# Patient Record
Sex: FEMALE | Race: WHITE | Employment: FULL TIME | URBAN - METROPOLITAN AREA
[De-identification: names, ages, dates, MRNs, and addresses within clinical notes are randomized per-mention and may not be internally consistent; named-entity substitution may affect disease eponyms.]

---

## 2024-08-18 SDOH — HEALTH STABILITY: PHYSICAL HEALTH: ON AVERAGE, HOW MANY DAYS PER WEEK DO YOU ENGAGE IN MODERATE TO STRENUOUS EXERCISE (LIKE A BRISK WALK)?: 7 DAYS

## 2024-08-18 SDOH — HEALTH STABILITY: PHYSICAL HEALTH: ON AVERAGE, HOW MANY MINUTES DO YOU ENGAGE IN EXERCISE AT THIS LEVEL?: 60 MIN

## 2024-08-19 ENCOUNTER — OFFICE VISIT (OUTPATIENT)
Dept: FAMILY MEDICINE CLINIC | Age: 29
End: 2024-08-19

## 2024-08-19 ENCOUNTER — TELEPHONE (OUTPATIENT)
Dept: FAMILY MEDICINE CLINIC | Age: 29
End: 2024-08-19

## 2024-08-19 VITALS
HEIGHT: 64 IN | DIASTOLIC BLOOD PRESSURE: 68 MMHG | BODY MASS INDEX: 24.69 KG/M2 | SYSTOLIC BLOOD PRESSURE: 100 MMHG | OXYGEN SATURATION: 98 % | WEIGHT: 144.6 LBS | HEART RATE: 77 BPM | TEMPERATURE: 98 F

## 2024-08-19 DIAGNOSIS — H91.90 HEARING LOSS, UNSPECIFIED HEARING LOSS TYPE, UNSPECIFIED LATERALITY: Primary | ICD-10-CM

## 2024-08-19 DIAGNOSIS — R42 DIZZINESS: ICD-10-CM

## 2024-08-19 DIAGNOSIS — H53.132 ACUTE LOSS OF VISION, LEFT: ICD-10-CM

## 2024-08-19 DIAGNOSIS — Z00.00 HEALTHCARE MAINTENANCE: ICD-10-CM

## 2024-08-19 DIAGNOSIS — R42 VERTIGO: Primary | ICD-10-CM

## 2024-08-19 RX ORDER — ONDANSETRON 4 MG/1
4 TABLET, ORALLY DISINTEGRATING ORAL DAILY PRN
Qty: 90 TABLET | Refills: 0 | Status: SHIPPED | OUTPATIENT
Start: 2024-08-19

## 2024-08-19 RX ORDER — LEVONORGESTREL 52 MG/1
1 INTRAUTERINE DEVICE INTRAUTERINE ONCE
COMMUNITY

## 2024-08-19 SDOH — ECONOMIC STABILITY: FOOD INSECURITY: WITHIN THE PAST 12 MONTHS, THE FOOD YOU BOUGHT JUST DIDN'T LAST AND YOU DIDN'T HAVE MONEY TO GET MORE.: NEVER TRUE

## 2024-08-19 SDOH — ECONOMIC STABILITY: INCOME INSECURITY: HOW HARD IS IT FOR YOU TO PAY FOR THE VERY BASICS LIKE FOOD, HOUSING, MEDICAL CARE, AND HEATING?: NOT HARD AT ALL

## 2024-08-19 SDOH — ECONOMIC STABILITY: FOOD INSECURITY: WITHIN THE PAST 12 MONTHS, YOU WORRIED THAT YOUR FOOD WOULD RUN OUT BEFORE YOU GOT MONEY TO BUY MORE.: NEVER TRUE

## 2024-08-19 ASSESSMENT — PATIENT HEALTH QUESTIONNAIRE - PHQ9
SUM OF ALL RESPONSES TO PHQ QUESTIONS 1-9: 0
2. FEELING DOWN, DEPRESSED OR HOPELESS: NOT AT ALL
SUM OF ALL RESPONSES TO PHQ QUESTIONS 1-9: 0
1. LITTLE INTEREST OR PLEASURE IN DOING THINGS: NOT AT ALL
SUM OF ALL RESPONSES TO PHQ9 QUESTIONS 1 & 2: 0
SUM OF ALL RESPONSES TO PHQ QUESTIONS 1-9: 0
SUM OF ALL RESPONSES TO PHQ QUESTIONS 1-9: 0

## 2024-08-19 ASSESSMENT — ANXIETY QUESTIONNAIRES
GAD7 TOTAL SCORE: 0
5. BEING SO RESTLESS THAT IT IS HARD TO SIT STILL: NOT AT ALL
1. FEELING NERVOUS, ANXIOUS, OR ON EDGE: NOT AT ALL
6. BECOMING EASILY ANNOYED OR IRRITABLE: NOT AT ALL
4. TROUBLE RELAXING: NOT AT ALL
3. WORRYING TOO MUCH ABOUT DIFFERENT THINGS: NOT AT ALL
2. NOT BEING ABLE TO STOP OR CONTROL WORRYING: NOT AT ALL
7. FEELING AFRAID AS IF SOMETHING AWFUL MIGHT HAPPEN: NOT AT ALL

## 2024-08-19 NOTE — PROGRESS NOTES
having an ear infection.  She had flu in August and COVID in July 2023.  She had 1 episode of loss of left eye vision lasting an hour.  It was isolated without other symptoms.  She reports having unremarkable CT head.    ROS  Denies fever, chills, night sweats  Denies headaches, sore throat, ear pain  Denies chest pain, dyspnea, palpitations  Denies nausea, vomiting, diarrhea  Denies joint pain  Denies depression, anxiety  Denies urinary symptoms  Denies rashes    Current Outpatient Medications   Medication Sig Dispense Refill    levonorgestrel (MIRENA, 52 MG,) IUD 52 mg 1 each by IntraUTERine route once      ondansetron (ZOFRAN-ODT) 4 MG disintegrating tablet Take 1 tablet by mouth daily as needed for Nausea or Vomiting (max of 1 tab per day) 90 tablet 0     No current facility-administered medications for this visit.        No Known Allergies     Past Medical History:   Diagnosis Date    Anxiety 2022    Chronic back pain 2022    Lumbar herniated disc     2022--- 3 months of PT/OMT---hamstring area/lateral thumb    Ruptured ovarian cyst     Transfusion history     secondary to cyst    Vertigo     2022        Past Surgical History:   Procedure Laterality Date    OVARIAN CYST REMOVAL Left     rupture        Social History     Social History Narrative    , works in marketing, moved to area to be closer to family, has dogs (loves to hike/swim with them), artisit        Family History   Problem Relation Age of Onset    Miscarriages / Stillbirths Mother     Substance Abuse Mother     Alcohol Abuse Father     Mental Illness Father         Bipolar disorder    Substance Abuse Father     Mental Illness Maternal Grandmother         Depression    Substance Abuse Maternal Grandmother           Pamela Hyman DO

## 2024-08-19 NOTE — TELEPHONE ENCOUNTER
Need to have a new order added to epic RE: MRI brain. States order needs to be with and without contrast per radiology protocol. Please add new order to epic. If need to speak with Licha about this order, she is reachable at [phone] x [phone]

## 2024-08-21 ENCOUNTER — PATIENT MESSAGE (OUTPATIENT)
Dept: FAMILY MEDICINE CLINIC | Age: 29
End: 2024-08-21

## 2024-08-21 DIAGNOSIS — F41.9 ANXIETY: Primary | ICD-10-CM

## 2024-08-23 RX ORDER — LORAZEPAM 1 MG/1
TABLET ORAL
Qty: 1 TABLET | Refills: 0 | Status: SHIPPED | OUTPATIENT
Start: 2024-08-23 | End: 2024-08-24

## 2024-08-30 ENCOUNTER — HOSPITAL ENCOUNTER (OUTPATIENT)
Dept: MRI IMAGING | Age: 29
Discharge: HOME | End: 2024-08-30
Payer: COMMERCIAL

## 2024-08-30 DIAGNOSIS — R42 DIZZINESS: ICD-10-CM

## 2024-08-30 DIAGNOSIS — H53.132 ACUTE LOSS OF VISION, LEFT: ICD-10-CM

## 2024-08-30 DIAGNOSIS — R42 VERTIGO: ICD-10-CM

## 2024-08-30 PROCEDURE — 70553 MRI BRAIN STEM W/O & W/DYE: CPT

## 2024-08-30 PROCEDURE — 6360000004 HC RX CONTRAST MEDICATION: Performed by: FAMILY MEDICINE

## 2024-08-30 PROCEDURE — A9577 INJ MULTIHANCE: HCPCS | Performed by: FAMILY MEDICINE

## 2024-08-30 RX ADMIN — GADOBENATE DIMEGLUMINE 12 ML: 529 INJECTION, SOLUTION INTRAVENOUS at 08:42

## 2024-08-30 NOTE — PROGRESS NOTES
Nicky Salas   1995, 28 y.o. female   [id number]       Referring Provider: Mario Alberto Song MD  Referral Type: In an order in Epic    Reason for Visit: Evaluation of suspected change in hearing, tinnitus, or balance.    ADULT AUDIOLOGIC EVALUATION      Nicky Salas is a 28 y.o. female seen today, 9/3/2024 , for an initial audiologic evaluation.  Patient was seen by Mario Alberto Song MD following today's evaluation.    AUDIOLOGIC AND OTHER PERTINENT MEDICAL HISTORY:      Nicky Salas reports vertigo and imbalance lasting several minutes to several hours in duration. Patient reported the dizzy spells can occur at random and do not seem to be triggered by head/body movement. Patient reported a history of occupational noise exposure and wore hearing protection when exposed to loud noise levels. Patient reported pulsatile tinnitus and a constant low-pitched dull tinnitus bilaterally. Patient reported recent sinus congestion.     She denied otalgia, aural fullness, and history of ear surgery    Date: 9/3/2024     IMPRESSIONS:      Today's results revealed a Mild Conductive Hearing Loss rising to Normal Hearing, bilaterally. Excellent speech understanding when in quiet. Tympanometry indicates normal middle ear function.     Discussed test results and implications with patient. Recommended consult with ENT physician due to noted history of tinnitus and vertigo. Discussed use of tinnitus management strategies. Discussed noise exposure and the importance of appropriate hearing protection when in hazardous noise environments.    Follow medical recommendations of Mario Alberto Song MD.     ASSESSMENT AND FINDINGS:     Otoscopy unremarkable.    RIGHT EAR:  Hearing Sensitivity: Mild Conductive Hearing Loss rising to Normal Hearing Sensitivity  Speech Recognition Threshold: 20 dB HL  Word Recognition: Excellent 100%, based on NU-6 by-difficulty list at 60 dBHL with 30 dB HL masking noise using recorded speech stimuli.    Tympanometry:

## 2024-09-03 ENCOUNTER — PROCEDURE VISIT (OUTPATIENT)
Dept: AUDIOLOGY | Age: 29
End: 2024-09-03
Payer: COMMERCIAL

## 2024-09-03 ENCOUNTER — OFFICE VISIT (OUTPATIENT)
Dept: ENT CLINIC | Age: 29
End: 2024-09-03
Payer: COMMERCIAL

## 2024-09-03 VITALS
WEIGHT: 144 LBS | HEIGHT: 64 IN | BODY MASS INDEX: 24.59 KG/M2 | SYSTOLIC BLOOD PRESSURE: 124 MMHG | DIASTOLIC BLOOD PRESSURE: 83 MMHG | HEART RATE: 79 BPM

## 2024-09-03 DIAGNOSIS — G43.809 VESTIBULAR MIGRAINE: ICD-10-CM

## 2024-09-03 DIAGNOSIS — H90.0 CONDUCTIVE HEARING LOSS OF BOTH EARS: ICD-10-CM

## 2024-09-03 DIAGNOSIS — H54.7 VISION LOSS: ICD-10-CM

## 2024-09-03 DIAGNOSIS — H83.8X3 SUPERIOR SEMICIRCULAR CANAL DEHISCENCE OF BOTH EARS: Primary | ICD-10-CM

## 2024-09-03 DIAGNOSIS — H90.0 CONDUCTIVE HEARING LOSS, BILATERAL: ICD-10-CM

## 2024-09-03 DIAGNOSIS — H93.13 TINNITUS OF BOTH EARS: ICD-10-CM

## 2024-09-03 DIAGNOSIS — R42 VERTIGO: ICD-10-CM

## 2024-09-03 DIAGNOSIS — R42 DIZZINESS AND GIDDINESS: Primary | ICD-10-CM

## 2024-09-03 PROCEDURE — 99204 OFFICE O/P NEW MOD 45 MIN: CPT | Performed by: STUDENT IN AN ORGANIZED HEALTH CARE EDUCATION/TRAINING PROGRAM

## 2024-09-03 PROCEDURE — 92567 TYMPANOMETRY: CPT

## 2024-09-03 PROCEDURE — 92557 COMPREHENSIVE HEARING TEST: CPT

## 2024-09-03 NOTE — PATIENT INSTRUCTIONS
(cochlea).  These cells process the loudness (intensity) and pitch (frequency) of sound and send the signal to the brain via our auditory nerve (vestibulocochlear nerve, cranial nerve VIII).  When these cells are damaged, they can result in permanent hearing loss and/or tinnitus.  The hair cells responsible for high frequency sounds, like birds chirping, are most likely to be damaged due to loud sounds.  The high frequency sounds are also very important for our clarity and understanding of speech.      OCCUPATIONAL NOISE EXPOSURE RECREATIONAL NOISE EXPOSURE   Some jobs may have exposure to loud sounds in the workplace.  These jobs may include but are not limited to:     Factory settings  Manufacturing  Construction  Welding  Landscaping  Hairdressing/hairstyling  Musicians  Air Traffic   ... And more! Many activities outside of work may cause permanent hearing loss.  These activities may include but are not limited to:  Lawnmowers, leaf blowers  Farming equipment and animals (such as pigs squealing)  Chainsaws and other power tools  Playing musical instruments and/or singing  Listening to music too loudly - at concerts, through stereo, through ear buds or headphones  Attending sporting events  Attending fireworks shows or using fireworks at home  Use of firearms  ... And more!       REDUCE OR PROTECT YOUR EARS FROM NOISE EXPOSURE    To do your best to avoid noise-induced hearing loss, here are some tips:  Limit exposure to loud sounds.  85 dB (decibels) is safe for 8 hours.  As sounds are louder, the length of time the sound is safe lessens.  These numbers are cumulative across a 24-hour period.  (NIOSH and CDC, 2002)  85 dB is safe for 8 hours  88 dB is safe for 4 hours  91 dB is safe for 2 hours  94 dB is safe for 1 hour  97 dB is safe for 30 minutes  100 dB is safe for 15 minutes  103 dB is safe for 7.5 minutes  106 dB is safe for 3.75 minutes  109 dB is safe for LESS THAN 2 minutes  112 dB is safe for

## 2024-09-03 NOTE — PROGRESS NOTES
[facility]  DIVISION OF OTOLARYNGOLOGY- HEAD & NECK SURGERY  CONSULT      Nicky Salas (:  1995) is a 28 y.o. female, here for evaluation of the following chief complaint(s):  Dizziness      ASSESSMENT/PLAN:  1. 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 Vertigo  -     CT IAC POSTERIOR FOSSA WO CONTRAST; Future  -     Pollo Sheridan MD, Neurology, [facility]  3. Vestibular migraine  -     CT IAC POSTERIOR FOSSA WO CONTRAST; Future  -     Pollo Sheridan MD, Neurology, [facility]  4. Conductive hearing loss, bilateral  -     Pollo Sheridan MD, Neurology, [facility]  5. Vision loss  -     Pollo Sheridan MD, Neurology, [facility]      This is a very pleasant 28 y.o. female here today for evaluation of the the above-noted complaints.      Patient has a long history of vertigo.  Her symptoms are not consistent with a peripheral vestibulopathy, and prior vestibular testing has ruled this out.    Her symptoms may be consistent with vestibular and ocular migraines.  I will begin her on magnesium, riboflavin and coenzyme every 10.  I have asked her to keep a dietary and sleep log.    I will obtain a CT IAC to rule out something rare such as semicircular canal dehiscence.    We discussed her audiogram.  We discussed that it shows mild low-frequency sensorineural hearing loss.    I will call her with her CT scan results.    Medical Decision Making:  The following items were considered in medical decision making:  Independent review of images  Review / order clinical lab tests  Review / order radiology tests  Decision to obtain old records  Review and summation of old records as accessed through [facility] if applicable    SUBJECTIVE/OBJECTIVE:  HPI    Nicky Salas is an 28 y.o. female with history of vertigo    How long:

## 2024-09-06 ENCOUNTER — TELEPHONE (OUTPATIENT)
Dept: FAMILY MEDICINE CLINIC | Age: 29
End: 2024-09-06

## 2024-09-17 ENCOUNTER — TELEPHONE (OUTPATIENT)
Dept: ENT CLINIC | Age: 29
End: 2024-09-17

## 2024-11-05 ENCOUNTER — HOSPITAL ENCOUNTER (OUTPATIENT)
Dept: NEUROLOGY | Age: 29
Discharge: HOME | End: 2024-11-05
Payer: COMMERCIAL

## 2024-11-05 DIAGNOSIS — R42 VERTIGO: ICD-10-CM

## 2024-11-05 DIAGNOSIS — R56.9 PARTIAL SEIZURE (HCC): ICD-10-CM

## 2024-11-05 DIAGNOSIS — G43.909 MIGRAINE SYNDROME: ICD-10-CM

## 2024-11-05 PROCEDURE — 95819 EEG AWAKE AND ASLEEP: CPT

## 2024-11-06 NOTE — PROCEDURES
[facility] [place]          [address] [place] [address]                       ELECTROENCEPHALOGRAM REPORT      PATIENT NAME: RAMANA PLATT               : 1995  MED REC NO: [id number]                      ROOM:   ACCOUNT NO: [id number]                       ADMIT DATE: 2024  PROVIDER: Melecio Lambert DO      DATE OF SERVICE:  2024    REFERRING PHYSICIAN:  RACHEL SAAVEDRA MD    REASON FOR STUDY:  Partial seizure, vertigo, migraine.    BRIEF HISTORY AND NEUROLOGIC FINDINGS:  The patient is a 29-year-old female being evaluated for reported concerns for seizure, vertigo, and migraine.    MEDICATIONS:  The patient was on no centrally acting medications.    EEG FINDINGS:  This is a 20-channel digital EEG performed utilizing bipolar and referential montages.  Wakefulness, drowsiness, and sleep were obtained during the recording.  During maximal wakefulness, there was a moderate voltage, symmetric, fairly well regulated and reactive 10 hertz posterior dominant background rhythm.  The anterior background consisted of low voltage fast frequencies.  Drowsiness was manifested by attenuation of the waking background rhythms.  Brief sleep was manifested by sleep spindles and K-complexes.    Photic stimulation was performed at various flash frequencies and evoked a symmetric posterior driving response at multiple frequencies.  Hyperventilation exercise was performed and did not significantly alter the underlying background rhythms.    A 1-channel EKG rhythm strip was reviewed and showed no obvious cardiac dysrhythmias, though there was significant artifact present at times, which occasionally obscured some of the underlying background rhythms.  This was most notable during hyperventilation exercise.    Significant myogenic artifact was present in the bitemporal regions, which often obscured some of the underlying background rhythms as well.    EEG DIAGNOSIS:

## 2025-07-09 ENCOUNTER — APPOINTMENT (OUTPATIENT)
Dept: GENERAL RADIOLOGY | Age: 30
End: 2025-07-09
Payer: COMMERCIAL

## 2025-07-09 ENCOUNTER — HOSPITAL ENCOUNTER (EMERGENCY)
Age: 30
Discharge: HOME | End: 2025-07-09
Attending: EMERGENCY MEDICINE
Payer: COMMERCIAL

## 2025-07-09 VITALS
RESPIRATION RATE: 18 BRPM | SYSTOLIC BLOOD PRESSURE: 121 MMHG | TEMPERATURE: 98 F | WEIGHT: 149.91 LBS | BODY MASS INDEX: 25.59 KG/M2 | HEART RATE: 99 BPM | HEIGHT: 64 IN | DIASTOLIC BLOOD PRESSURE: 68 MMHG | OXYGEN SATURATION: 99 %

## 2025-07-09 DIAGNOSIS — S46.911A STRAIN OF RIGHT SHOULDER, INITIAL ENCOUNTER: Primary | ICD-10-CM

## 2025-07-09 PROCEDURE — 73030 X-RAY EXAM OF SHOULDER: CPT

## 2025-07-09 PROCEDURE — 99283 EMERGENCY DEPT VISIT LOW MDM: CPT

## 2025-07-09 ASSESSMENT — PAIN DESCRIPTION - ORIENTATION: ORIENTATION: RIGHT;UPPER

## 2025-07-09 ASSESSMENT — LIFESTYLE VARIABLES
HOW OFTEN DO YOU HAVE A DRINK CONTAINING ALCOHOL: NEVER
HOW MANY STANDARD DRINKS CONTAINING ALCOHOL DO YOU HAVE ON A TYPICAL DAY: PATIENT DOES NOT DRINK

## 2025-07-09 ASSESSMENT — PAIN SCALES - GENERAL
PAINLEVEL_OUTOF10: 6
PAINLEVEL_OUTOF10: 6

## 2025-07-09 ASSESSMENT — PAIN DESCRIPTION - LOCATION: LOCATION: ARM

## 2025-07-09 ASSESSMENT — PAIN DESCRIPTION - DESCRIPTORS: DESCRIPTORS: DISCOMFORT

## 2025-07-09 ASSESSMENT — PAIN - FUNCTIONAL ASSESSMENT: PAIN_FUNCTIONAL_ASSESSMENT: 0-10

## 2025-07-09 NOTE — ED PROVIDER NOTES
Difficulty of Paying Living Expenses: Not hard at all   Food Insecurity: No Food Insecurity (8/19/2024)    Hunger Vital Sign     Worried About Running Out of Food in the Last Year: Never true     Ran Out of Food in the Last Year: Never true   Transportation Needs: Unknown (8/19/2024)    PRAPARE - Transportation     Lack of Transportation (Non-Medical): No   Physical Activity: Sufficiently Active (8/18/2024)    Exercise Vital Sign     Days of Exercise per Week: 7 days     Minutes of Exercise per Session: 60 min   Stress: No Stress Concern Present (9/4/2020)    Received from [facility]    [facility] - Occupational Stress Questionnaire     Feeling of Stress : Only a little   Intimate Partner Violence: Not At Risk (9/4/2020)    Received from [facility]    Humiliation, Afraid, Rape, and Kick questionnaire     Fear of Current or Ex-Partner: No     Emotionally Abused: No     Physically Abused: No     Sexually Abused: No   Housing Stability: Unknown (8/19/2024)    Housing Stability Vital Sign     Homeless in the Last Year: No         PHYSICAL EXAM    (up to 7 for level 4, 8 or more for level 5)     ED Triage Vitals [07/09/25 1926]   BP Systolic BP Percentile Diastolic BP Percentile Temp Temp Source Pulse Respirations SpO2   121/68 -- -- 98 °F (36.7 °C) Oral 99 18 99 %      Height Weight - Scale         1.626 m (5' 4\") 68 kg (149 lb 14.6 oz)             General: Alert white female in no acute distress.  Musculoskeletal: Right clavicle and AC joint are nontender.  The right scapular and periscapular area are nontender.  She has no significant tenderness in the glenohumeral joint area.  She has full range of motion including abduction, adduction, internal and external rotation, flexion and extension with mild to moderate pain.  The remainder the humerus elbow forearm and wrist are nontender with intact range of motion intact distal pulses.  Skin: No bruising, lacerations, or abrasions to right

## 2025-07-10 ENCOUNTER — TELEPHONE (OUTPATIENT)
Dept: ORTHOPEDIC SURGERY | Age: 30
End: 2025-07-10

## 2025-07-10 NOTE — DISCHARGE INSTRUCTIONS
Use ice packs or cold compresses every 3 hours for the next couple days to help with pain.    Ibuprofen and Tylenol every 6 hours as needed for pain.    As discussed if continued pain in 7 to 10 days call orthopedic referral for follow-up for further evaluation.